# Patient Record
Sex: MALE | Race: BLACK OR AFRICAN AMERICAN | NOT HISPANIC OR LATINO | Employment: FULL TIME | URBAN - METROPOLITAN AREA
[De-identification: names, ages, dates, MRNs, and addresses within clinical notes are randomized per-mention and may not be internally consistent; named-entity substitution may affect disease eponyms.]

---

## 2017-08-06 ENCOUNTER — HOSPITAL ENCOUNTER (EMERGENCY)
Facility: HOSPITAL | Age: 52
Discharge: HOME | End: 2017-08-06
Attending: EMERGENCY MEDICINE
Payer: COMMERCIAL

## 2017-08-06 VITALS
HEART RATE: 85 BPM | TEMPERATURE: 98 F | SYSTOLIC BLOOD PRESSURE: 121 MMHG | WEIGHT: 185 LBS | OXYGEN SATURATION: 97 % | RESPIRATION RATE: 18 BRPM | DIASTOLIC BLOOD PRESSURE: 81 MMHG | BODY MASS INDEX: 32.78 KG/M2 | HEIGHT: 63 IN
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
## 2017-08-06 DIAGNOSIS — S39.012A BACK STRAIN, INITIAL ENCOUNTER: ICD-10-CM

## 2017-08-06 PROCEDURE — 99283 EMERGENCY DEPT VISIT LOW MDM: CPT

## 2017-08-06 RX ORDER — METHOCARBAMOL 500 MG/1
500-1000 TABLET, FILM COATED ORAL 3 TIMES DAILY PRN
Qty: 20 TABLET | Refills: 0 | Status: SHIPPED | OUTPATIENT
Start: 2017-08-06

## 2017-08-06 RX ORDER — PHENYLPROPANOLAMINE/CLEMASTINE 75-1.34MG
200-400 TABLET, EXTENDED RELEASE ORAL
Qty: 30 EACH | Refills: 0 | Status: SHIPPED | OUTPATIENT
Start: 2017-08-06 | End: 2020-12-24 | Stop reason: SDUPTHER
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
## 2017-08-21 ENCOUNTER — OFFICE VISIT (OUTPATIENT)
Dept: URGENT CARE | Facility: CLINIC | Age: 52
End: 2017-08-21
Payer: COMMERCIAL

## 2017-08-21 VITALS
HEART RATE: 79 BPM | HEIGHT: 64 IN | SYSTOLIC BLOOD PRESSURE: 110 MMHG | WEIGHT: 195 LBS | DIASTOLIC BLOOD PRESSURE: 75 MMHG | BODY MASS INDEX: 33.29 KG/M2 | TEMPERATURE: 99 F | RESPIRATION RATE: 16 BRPM | OXYGEN SATURATION: 98 %

## 2017-08-21 DIAGNOSIS — J04.0 LARYNGITIS: ICD-10-CM

## 2017-08-21 DIAGNOSIS — J02.9 ACUTE PHARYNGITIS, UNSPECIFIED ETIOLOGY: Primary | ICD-10-CM

## 2017-08-21 PROCEDURE — 99203 OFFICE O/P NEW LOW 30 MIN: CPT | Mod: 25,S$GLB,, | Performed by: EMERGENCY MEDICINE

## 2017-08-21 PROCEDURE — 96372 THER/PROPH/DIAG INJ SC/IM: CPT | Mod: S$GLB,,, | Performed by: EMERGENCY MEDICINE

## 2017-08-21 PROCEDURE — 3008F BODY MASS INDEX DOCD: CPT | Mod: S$GLB,,, | Performed by: EMERGENCY MEDICINE

## 2017-08-21 RX ORDER — CEFDINIR 300 MG/1
300 CAPSULE ORAL EVERY 12 HOURS
Qty: 14 CAPSULE | Refills: 0 | Status: SHIPPED | OUTPATIENT
Start: 2017-08-21 | End: 2017-08-28

## 2017-08-21 RX ORDER — BETAMETHASONE SODIUM PHOSPHATE AND BETAMETHASONE ACETATE 3; 3 MG/ML; MG/ML
9 INJECTION, SUSPENSION INTRA-ARTICULAR; INTRALESIONAL; INTRAMUSCULAR; SOFT TISSUE
Status: COMPLETED | OUTPATIENT
Start: 2017-08-21 | End: 2017-08-21

## 2017-08-21 RX ADMIN — BETAMETHASONE SODIUM PHOSPHATE AND BETAMETHASONE ACETATE 9 MG: 3; 3 INJECTION, SUSPENSION INTRA-ARTICULAR; INTRALESIONAL; INTRAMUSCULAR; SOFT TISSUE at 03:08

## 2017-08-21 NOTE — PROGRESS NOTES
"Subjective:       Patient ID: Denton Babb Jr. is a 52 y.o. male.    Vitals:  height is 5' 4" (1.626 m) and weight is 88.5 kg (195 lb). His tympanic temperature is 98.7 °F (37.1 °C). His blood pressure is 110/75 and his pulse is 79. His respiration is 16 and oxygen saturation is 98%.     Chief Complaint: Sore Throat    1 WEEK OF NOT IMPROVED SORE THROAT, WITH ACHES AND ALSO LOSING VOICE. NO FEVERS, NO CHILLS, NO COUGH, NO WEAKNESS, NO HEADACHE.      Sore Throat    This is a new problem. The current episode started 1 to 4 weeks ago. The problem has been unchanged. Neither side of throat is experiencing more pain than the other. There has been no fever. The pain is at a severity of 7/10. The pain is moderate. Associated symptoms include coughing, a hoarse voice and swollen glands. Pertinent negatives include no abdominal pain, congestion, ear pain, headaches or shortness of breath. He has tried nothing for the symptoms. The treatment provided no relief.     Review of Systems   Constitution: Negative for chills, fever and malaise/fatigue.   HENT: Positive for hoarse voice and sore throat. Negative for congestion, ear pain and headaches.    Eyes: Negative for discharge and redness.   Cardiovascular: Negative for chest pain, dyspnea on exertion and leg swelling.   Respiratory: Positive for cough. Negative for shortness of breath, sputum production and wheezing.    Musculoskeletal: Negative for myalgias.   Gastrointestinal: Negative for abdominal pain and nausea.       Objective:      Physical Exam   Constitutional: He is oriented to person, place, and time. He appears well-developed and well-nourished. No distress.   HENT:   Head: Normocephalic and atraumatic.   Right Ear: Hearing, external ear and ear canal normal. No drainage or tenderness. Tympanic membrane is not injected, not erythematous, not retracted and not bulging. No middle ear effusion.   Left Ear: Hearing, external ear and ear canal normal. No drainage or " tenderness. Tympanic membrane is not injected, not erythematous, not retracted and not bulging.  No middle ear effusion.   Nose: Nose normal. No mucosal edema or rhinorrhea. Right sinus exhibits no maxillary sinus tenderness and no frontal sinus tenderness. Left sinus exhibits no maxillary sinus tenderness and no frontal sinus tenderness.   Mouth/Throat: Mucous membranes are normal. No dental abscesses or uvula swelling. No oropharyngeal exudate, posterior oropharyngeal edema or posterior oropharyngeal erythema. No tonsillar exudate.   MARKED ERYTHEMA POSTERIOR Op AND SOFT PALATE, NO EXUDATE, UVULA MIDLINE, RASPY/MILDLY HOARSE VOICE, RIGHT TM WITH SEROUS OTITIS   Eyes: Conjunctivae and EOM are normal. Pupils are equal, round, and reactive to light. Right eye exhibits no discharge. Left eye exhibits no discharge.   Neck: Normal range of motion. Neck supple.   Cardiovascular: Normal rate and regular rhythm.  Exam reveals no gallop and no friction rub.    No murmur heard.  Pulmonary/Chest: Breath sounds normal. No respiratory distress. He has no wheezes. He has no rales. He exhibits no tenderness.   Abdominal: Bowel sounds are normal. There is no tenderness.   Musculoskeletal: Normal range of motion.   Lymphadenopathy:        Head (right side): No submental adenopathy present.        Head (left side): No submental adenopathy present.     He has no cervical adenopathy.        Right cervical: No superficial cervical and no posterior cervical adenopathy present.       Left cervical: No superficial cervical and no posterior cervical adenopathy present.   Neurological: He is alert and oriented to person, place, and time.   Skin: Skin is warm and dry. No rash noted.   Nursing note and vitals reviewed.      Assessment:       1. Acute pharyngitis, unspecified etiology    2. Laryngitis        Plan:         Acute pharyngitis, unspecified etiology  -     betamethasone acetate-betamethasone sodium phosphate injection 9 mg; Inject  1.5 mLs (9 mg total) into the muscle one time.  -     cefdinir (OMNICEF) 300 MG capsule; Take 1 capsule (300 mg total) by mouth every 12 (twelve) hours.  Dispense: 14 capsule; Refill: 0    Laryngitis  -     betamethasone acetate-betamethasone sodium phosphate injection 9 mg; Inject 1.5 mLs (9 mg total) into the muscle one time.        SYMPTOMS PRESENT FOR 1 WEEK, NOT RELIEVED WITH OTC MEDS, NOT IMPROVED, COVERED EMPIRICALLY

## 2017-08-21 NOTE — PATIENT INSTRUCTIONS
REST AND HYDRATE WITH PLENTY OF FLUIDS  CEFDINIR RX  1.5 CC CELESTONE GIVEN IN CLINIC  MOTRIN 600 MG EVERY 6 HOURS FOR FEVER/SORE THROAT  CLARITIN FOR CONGESTION  MUCINEX DM TWICE DAILY FOR COUGH  SEE PHARYNGITIS SHEET  RETURN FOR ANY CONCERNS OR PROBLEMS    When You Have a Sore Throat  A sore throat can be painful. There are many reasons why you may have a sore throat. Your healthcare provider will work with you to find the cause of your sore throat. He or she will also find the best treatment for you.      What causes a sore throat?  Sore throats can be caused or worsened by:  · Cold or flu viruses  · Bacteria  · Irritants such as tobacco smoke or air pollution  · Acid reflux  A healthy throat  The tonsils are on the sides of the throat near the base of the tongue. They collect viruses and bacteria and help fight infection. The throat (pharynx) is the passage for air. Mucus from the nasal cavity also moves down the passage.  An inflamed throat  The tonsils and pharynx can become inflamed due to a cold or flu virus. Postnasal drip (excess mucus draining from the nasal cavity) can irritate the throat. It can also make the throat or tonsils more likely to be infected by bacteria. Severe, untreated tonsillitis in children or adults can cause a pocket of pus (abscess) to form near the tonsil.  Your evaluation  A medical evaluation can help find the cause of your sore throat. It can also help your healthcare provider choose the best treatment for you. The evaluation may include a health history, physical exam, and diagnostic tests.  Health history  Your healthcare provider may ask you the following:  · How long has the sore throat lasted and how have you been treating it?  · Do you have any other symptoms, such as body aches, fever, or cough?  · Does your sore throat recur? If so, how often? How many days of school or work have you missed because of a sore throat?  · Do you have trouble eating or swallowing?  · Have you  "been told that you snore or have other sleep problems?  · Do you have bad breath?  · Do you cough up bad-tasting mucus?  Physical exam  During the exam, your healthcare provider checks your ears, nose, and throat for problems. He or she also checks for swelling in the neck, and may listen to your chest.  Possible tests  Other tests your healthcare provider may perform include:  · A throat swab to check for bacteria such as streptococcus (the bacteria that causes strep throat)  · A blood test to check for mononucleosis (a viral infection)  · A chest X-ray to rule out pneumonia, especially if you have a cough  Treating a sore throat  Treatment depends on many factors. What is the likely cause? Is the problem recent? Does it keep coming back? In many cases, the best thing to do is to treat the symptoms, rest, and let the problem heal itself. Antibiotics may help clear up some bacterial infections. For cases of severe or recurring tonsillitis, the tonsils may need to be removed.  Relieving your symptoms  · Dont smoke, and avoid secondhand smoke.  · For children, try throat sprays or Popsicles. Adults and older children may try lozenges.  · Drink warm liquids to soothe the throat and help thin mucus. Avoid alcohol, spicy foods, and acidic drinks such as orange juice. These can irritate the throat.  · Gargle with warm saltwater (1 teaspoon of salt to 8 ounces of warm water).  · Use a humidifier to keep air moist and relieve throat dryness.  · Try over-the-counter pain relievers such as acetaminophen or ibuprofen. Use as directed, and dont exceed the recommended dose. Dont give aspirin to children.   Are antibiotics needed?  If your sore throat is due to a bacterial infection, antibiotics may speed healing and prevent complications. Although group A streptococcus ("strep throat" or GAS) is the major treatable infection for a sore throat, GAS causes only 5% to 15% of sore throats in adults who seek medical care. Most " sore throats are caused by cold or flu viruses. And antibiotics dont treat viral illness. In fact, using antibiotics when theyre not needed may produce bacteria that are harder to kill. Your healthcare provider will prescribe antibiotics only if he or she thinks they are likely to help.  If antibiotics are prescribed  Take the medicine exactly as directed. Be sure to finish your prescription even if youre feeling better. And be sure to ask your healthcare provider or pharmacist what side effects are common and what to do about them.  Is surgery needed?  In some cases, tonsils need to be removed. This is often done as outpatient (same-day) surgery. Your healthcare provider may advise removing the tonsils in cases of:  · Several severe bouts of tonsillitis in a year. Severe episodes include those that lead to missed days of school or work, or that need to be treated with antibiotics.  · Tonsillitis that causes breathing problems during sleep  · Tonsillitis caused by food particles collecting in pouches in the tonsils (cryptic tonsillitis)  Call your healthcare provider if any of the following occur:  · Symptoms worsen, or new symptoms develop.  · Swollen tonsils make breathing difficult.  · The pain is severe enough to keep you from drinking liquids.  · A skin rash, hives, or wheezing develops. Any of these could signal an allergic reaction to antibiotics.  · Symptoms dont improve within a week.  · Symptoms dont improve within 2 to 3 days of starting antibiotics.   Date Last Reviewed: 10/1/2016  © 7711-3140 [employer]. [address] [place] [address], [place] [address]. All rights reserved. This information is not intended as a substitute for professional medical care. Always follow your healthcare professional's instructions.

## 2020-05-19 ENCOUNTER — HOSPITAL ENCOUNTER (EMERGENCY)
Facility: HOSPITAL | Age: 55
Discharge: HOME | End: 2020-05-19
Attending: EMERGENCY MEDICINE
Payer: OTHER GOVERNMENT

## 2020-05-19 VITALS
DIASTOLIC BLOOD PRESSURE: 78 MMHG | TEMPERATURE: 99 F | RESPIRATION RATE: 18 BRPM | HEIGHT: 63 IN | BODY MASS INDEX: 38.09 KG/M2 | OXYGEN SATURATION: 97 % | HEART RATE: 91 BPM | SYSTOLIC BLOOD PRESSURE: 139 MMHG | WEIGHT: 215 LBS

## 2020-05-19 DIAGNOSIS — A08.4 VIRAL GASTROENTERITIS: Primary | ICD-10-CM

## 2020-05-19 LAB — SARS-COV-2 RDRP RESP QL NAA+PROBE: NEGATIVE

## 2020-05-19 PROCEDURE — U0002 COVID-19 LAB TEST NON-CDC: HCPCS | Mod: ER

## 2020-05-19 PROCEDURE — 99282 EMERGENCY DEPT VISIT SF MDM: CPT | Mod: ER

## 2020-05-22 NOTE — ED PROVIDER NOTES
Encounter Date: 5/19/2020       History     Chief Complaint   Patient presents with    Fever     c/o fever and diarrha that started on Saturday. Pt took tylenol 1 hr pta. No cough. Reports mild SOB.    Diarrhea     Patient currently presents with chief complaint of nausea and diarrhea.  Onset noted Saturday.  There have been roughly 2 bouts of associated diarrhea each day.  Patient reports subjective fever.  Patient denies sustained abdominal pain.  Patient denies urinary symptoms.  There is not blood in the stools.        Review of patient's allergies indicates:  No Known Allergies  Past Medical History:   Diagnosis Date    Closed head injury      Past Surgical History:   Procedure Laterality Date    CHOLECYSTECTOMY      LEG SURGERY Left      History reviewed. No pertinent family history.  Social History     Tobacco Use    Smoking status: Never Smoker    Smokeless tobacco: Never Used   Substance Use Topics    Alcohol use: No    Drug use: Never     Review of Systems   Constitutional: Positive for fever. Negative for chills.   HENT: Negative for congestion.    Respiratory: Negative for chest tightness and shortness of breath.    Cardiovascular: Negative for chest pain and leg swelling.   Gastrointestinal: Positive for diarrhea. Negative for abdominal pain, constipation, nausea and vomiting.   Genitourinary: Negative for dysuria, frequency and urgency.   Skin: Negative for color change and rash.   Allergic/Immunologic: Negative for immunocompromised state.   Neurological: Negative for weakness and numbness.   Hematological: Negative for adenopathy. Does not bruise/bleed easily.   All other systems reviewed and are negative.      Physical Exam     Initial Vitals [05/19/20 1826]   BP Pulse Resp Temp SpO2   139/78 91 18 98.6 °F (37 °C) 97 %      MAP       --         Physical Exam    Nursing note and vitals reviewed.  Constitutional: He appears well-developed and well-nourished. He is not diaphoretic. No  distress.   HENT:   Head: Normocephalic and atraumatic.   Right Ear: External ear normal.   Left Ear: External ear normal.   Nose: Nose normal.   Mouth/Throat: Oropharynx is clear and moist.   Eyes: Conjunctivae and EOM are normal. Pupils are equal, round, and reactive to light. No scleral icterus.   Neck: Neck supple. No JVD present.   Cardiovascular: Normal rate, regular rhythm, normal heart sounds and intact distal pulses. Exam reveals no gallop and no friction rub.    No murmur heard.  Pulmonary/Chest: Breath sounds normal. No respiratory distress. He has no wheezes. He has no rhonchi. He has no rales.   Abdominal: Soft. Bowel sounds are normal. He exhibits no distension. There is no tenderness.   Musculoskeletal: Normal range of motion. He exhibits no edema.   Neurological: He is alert and oriented to person, place, and time.   Skin: Skin is warm and dry. No rash noted.   Psychiatric: He has a normal mood and affect. His behavior is normal.         ED Course   Procedures  Labs Reviewed   SARS-COV-2 RNA AMPLIFICATION, QUAL    Narrative:     What symptom criteria does the patient meet?->Fever  What symptom criteria does the patient meet?->Other (specify)  Please specify:->diarrhea          Imaging Results    None          Medical Decision Making:   ED Management:  All historical and physical findings were reviewed with the patient in detail.  Patient was advised of a diagnosis of acute viral gastroenteritis.  Patient appears adequately hydrated at this time but was advised to maintain generous hydration and gradually advance bland food intake with the use of antiemetics.  Patient was also counseled regarding use of kaopectate for management of diarrhea should it become necessary.  All remaining questions and concerns were addressed at that time.      I see no indication of an emergent process beyond that addressed during our encounter but have duly counseled the patient/family regarding the need for prompt  follow-up as well as the indications (including but not limited to intractable vomiting, prolonged diarrhea, fever, sustained abdominal pain) that should prompt immediate return to the emergency room should new or worrisome developments occur.                                   Clinical Impression:       ICD-10-CM ICD-9-CM   1. Viral gastroenteritis A08.4 008.8             ED Disposition Condition    Discharge Stable        ED Prescriptions     None        Follow-up Information     Follow up With Specialties Details Why Contact Info    PCP  Schedule an appointment as soon as possible for a visit in 3 days for reassessment                                      Basilio Lucas MD  05/22/20 1102

## 2023-03-04 ENCOUNTER — OCCUPATIONAL HEALTH (OUTPATIENT)
Dept: URGENT CARE | Facility: CLINIC | Age: 58
End: 2023-03-04

## 2023-03-04 ENCOUNTER — TELEPHONE (OUTPATIENT)
Dept: URGENT CARE | Facility: CLINIC | Age: 58
End: 2023-03-04

## 2023-03-04 DIAGNOSIS — Z02.83 ENCOUNTER FOR DRUG SCREENING: Primary | ICD-10-CM

## 2023-03-04 LAB
CTP QC/QA: YES
POC 10 PANEL DRUG SCREEN: NEGATIVE

## 2023-03-04 PROCEDURE — 80305 DRUG TEST PRSMV DIR OPT OBS: CPT | Mod: S$GLB,,,

## 2023-03-04 PROCEDURE — 80305 POCT RAPID DRUG SCREEN 10 PANEL: ICD-10-PCS | Mod: S$GLB,,,

## 2023-10-21 ENCOUNTER — OFFICE VISIT (OUTPATIENT)
Dept: URGENT CARE | Facility: CLINIC | Age: 58
End: 2023-10-21
Payer: COMMERCIAL

## 2023-10-21 VITALS
HEART RATE: 89 BPM | BODY MASS INDEX: 38.09 KG/M2 | TEMPERATURE: 97 F | HEIGHT: 63 IN | SYSTOLIC BLOOD PRESSURE: 99 MMHG | OXYGEN SATURATION: 100 % | DIASTOLIC BLOOD PRESSURE: 68 MMHG | RESPIRATION RATE: 18 BRPM | WEIGHT: 215 LBS

## 2023-10-21 DIAGNOSIS — L03.031 CELLULITIS OF RIGHT TOE: ICD-10-CM

## 2023-10-21 DIAGNOSIS — B35.3 TINEA PEDIS OF BOTH FEET: ICD-10-CM

## 2023-10-21 DIAGNOSIS — B35.1 ONYCHOMYCOSIS: ICD-10-CM

## 2023-10-21 DIAGNOSIS — L08.9 RIGHT FOOT INFECTION: Primary | ICD-10-CM

## 2023-10-21 LAB — GLUCOSE SERPL-MCNC: 86 MG/DL (ref 70–110)

## 2023-10-21 PROCEDURE — 99214 PR OFFICE/OUTPT VISIT, EST, LEVL IV, 30-39 MIN: ICD-10-PCS | Mod: S$GLB,,, | Performed by: FAMILY MEDICINE

## 2023-10-21 PROCEDURE — 82962 POCT GLUCOSE, HAND-HELD DEVICE: ICD-10-PCS | Mod: S$GLB,,, | Performed by: FAMILY MEDICINE

## 2023-10-21 PROCEDURE — 99214 OFFICE O/P EST MOD 30 MIN: CPT | Mod: S$GLB,,, | Performed by: FAMILY MEDICINE

## 2023-10-21 PROCEDURE — 82962 GLUCOSE BLOOD TEST: CPT | Mod: S$GLB,,, | Performed by: FAMILY MEDICINE

## 2023-10-21 RX ORDER — CEPHALEXIN 500 MG/1
500 CAPSULE ORAL EVERY 8 HOURS
Qty: 21 CAPSULE | Refills: 0 | Status: SHIPPED | OUTPATIENT
Start: 2023-10-21 | End: 2023-10-28

## 2023-10-21 RX ORDER — CEPHALEXIN 500 MG/1
500 CAPSULE ORAL EVERY 8 HOURS
Qty: 21 CAPSULE | Refills: 0 | Status: SHIPPED | OUTPATIENT
Start: 2023-10-21 | End: 2023-10-21

## 2023-10-21 RX ORDER — CLOTRIMAZOLE 1 %
CREAM (GRAM) TOPICAL 2 TIMES DAILY
Qty: 60 G | Refills: 0 | Status: SHIPPED | OUTPATIENT
Start: 2023-10-21 | End: 2023-11-18

## 2023-10-21 RX ORDER — MUPIROCIN 20 MG/G
OINTMENT TOPICAL
Status: COMPLETED | OUTPATIENT
Start: 2023-10-21 | End: 2023-10-21

## 2023-10-21 RX ORDER — CLOTRIMAZOLE 1 %
CREAM (GRAM) TOPICAL 2 TIMES DAILY
Qty: 60 G | Refills: 0 | Status: SHIPPED | OUTPATIENT
Start: 2023-10-21 | End: 2023-10-21

## 2023-10-21 RX ADMIN — MUPIROCIN: 20 OINTMENT TOPICAL at 03:10

## 2023-10-21 NOTE — PATIENT INSTRUCTIONS
Rest and elevate the foot as much as possible.    Start oral antibiotics to cover for bacterial skin infection called cellulitis of the 4th toe.    You can use the topical cream called clotrimazole as an antifungal medication for both feet and use that for next 28 days.      Follow up closely with the primary care physician and also podiatrist.  Referral placed for both of these providers.      You can call our  line at [phone] and they can assist you in making these appointments

## 2023-10-21 NOTE — LETTER
October 21, 2023      [facility] [address] [address]  Phone: [phone]  Fax: [phone]       Patient: Denton Babb   YOB: 1965  Date of Visit: 10/21/2023    To Whom It May Concern:    Som Babb  was at [facility] on 10/21/2023. The patient may return to work/school on 10/23/23 with no restrictions. If you have any questions or concerns, or if I can be of further assistance, please do not hesitate to contact me.    Sincerely,    Gayle Jones, DO

## 2023-10-21 NOTE — PROGRESS NOTES
"Subjective:      Patient ID: Denton Babb Jr. is a 58 y.o. male.    Vitals:  height is 5' 3" (1.6 m) and weight is 97.5 kg (215 lb). His temperature is 97.4 °F (36.3 °C). His blood pressure is 99/68 and his pulse is 89. His respiration is 18 and oxygen saturation is 100%.     Chief Complaint: Toe Pain    Pt presents today with complaints of toe pain and irritation. Right fourth toe. Pt states he started an "foot soak"  earlier in the week.  He is unsure with the name or the active ingredients at the soak is but it is supposed to be for foot fungus.  It is over-the-counter.. Pain is worsened by shoes and pressure.  Denies any fever chills.    Toe Pain   The incident occurred 5 to 7 days ago. The incident occurred at home. There was no injury mechanism. The pain is present in the right foot. The quality of the pain is described as aching. The pain is at a severity of 10/10. The pain is severe. The pain has been Constant since onset. Associated symptoms include an inability to bear weight. Pertinent negatives include no muscle weakness, numbness or tingling. The symptoms are aggravated by weight bearing. He has tried ice for the symptoms. The treatment provided no relief.       Neurological:  Negative for numbness.      Objective:     Physical Exam  Constitutional: Pt oriented to person, place, and time.  Non-toxic appearance.   Patient does not appear ill. No distress. normal  HENT: No icterus or facial swelling appreciated  Head: Normocephalic and atraumatic.   Nose: No congestion.   Pulmonary/Chest: Effort normal. No stridor. No respiratory distress.   Abdominal: Normal appearance. Abdomen exhibits no distension.   Musculoskeletal:         General: No swelling.   Neurological: no focal deficit. Patient is alert and oriented to person, place, and time.   Skin: there is shallow ulceration/maceration of the lateral aspect of right 4th toe. No gross swelling of the right toe.  No drainage from the wound site.  No " swelling of the foot.  No proximal streaking or gross erythema  There is thickening and discoloration of toenails on the right foot  Psychiatric: Patients behavior is normal. Mood, judgment and thought content normal.     Assessment:     1. Right foot infection    2. Onychomycosis    3. Tinea pedis of both feet    4. Cellulitis of right toe        Plan:       Right foot infection  -     POCT Glucose, Hand-Held Device  -     mupirocin 2 % ointment  -     cephALEXin (KEFLEX) 500 MG capsule; Take 1 capsule (500 mg total) by mouth every 8 (eight) hours. for 7 days  Dispense: 21 capsule; Refill: 0  -     clotrimazole (LOTRIMIN) 1 % cream; Apply topically 2 (two) times daily.  Dispense: 60 g; Refill: 0  -     Ambulatory referral/consult to Podiatry  -     Ambulatory referral/consult to Family Practice    Onychomycosis  -     Ambulatory referral/consult to Podiatry    Tinea pedis of both feet  -     Ambulatory referral/consult to Podiatry    Cellulitis of right toe  -     Ambulatory referral/consult to Podiatry

## 2023-10-23 ENCOUNTER — TELEPHONE (OUTPATIENT)
Dept: PODIATRY | Facility: CLINIC | Age: 58
End: 2023-10-23
Payer: COMMERCIAL

## 2023-10-23 NOTE — TELEPHONE ENCOUNTER
Called patient to set up appointment, patient states he is busy and will call back tomorrow to set up appointment. No further issues discussed.

## 2023-10-24 ENCOUNTER — TELEPHONE (OUTPATIENT)
Dept: PODIATRY | Facility: CLINIC | Age: 58
End: 2023-10-24
Payer: COMMERCIAL

## 2023-10-24 NOTE — TELEPHONE ENCOUNTER
Unfortunately [facility] doesn't take  [facility] insurance. Pt must schedule at [facility]